# Patient Record
Sex: FEMALE | Race: WHITE | NOT HISPANIC OR LATINO | URBAN - METROPOLITAN AREA
[De-identification: names, ages, dates, MRNs, and addresses within clinical notes are randomized per-mention and may not be internally consistent; named-entity substitution may affect disease eponyms.]

---

## 2021-04-16 ENCOUNTER — BULK ORDERING (OUTPATIENT)
Dept: CASE MANAGEMENT | Facility: OTHER | Age: 31
End: 2021-04-16

## 2021-04-16 DIAGNOSIS — Z23 IMMUNIZATION DUE: ICD-10-CM

## 2023-02-10 ENCOUNTER — TELEPHONE (OUTPATIENT)
Dept: FAMILY MEDICINE CLINIC | Facility: CLINIC | Age: 33
End: 2023-02-10

## 2023-02-10 ENCOUNTER — OFFICE VISIT (OUTPATIENT)
Dept: FAMILY MEDICINE CLINIC | Facility: CLINIC | Age: 33
End: 2023-02-10
Payer: COMMERCIAL

## 2023-02-10 VITALS
DIASTOLIC BLOOD PRESSURE: 84 MMHG | OXYGEN SATURATION: 100 % | HEART RATE: 92 BPM | SYSTOLIC BLOOD PRESSURE: 118 MMHG | BODY MASS INDEX: 31.83 KG/M2 | WEIGHT: 173 LBS | HEIGHT: 62 IN

## 2023-02-10 DIAGNOSIS — Z00.00 ROUTINE GENERAL MEDICAL EXAMINATION AT A HEALTH CARE FACILITY: Primary | ICD-10-CM

## 2023-02-10 DIAGNOSIS — B08.4 HAND, FOOT AND MOUTH DISEASE: ICD-10-CM

## 2023-02-10 DIAGNOSIS — Z13.220 LIPID SCREENING: ICD-10-CM

## 2023-02-10 PROCEDURE — 99395 PREV VISIT EST AGE 18-39: CPT | Performed by: FAMILY MEDICINE

## 2023-02-10 PROCEDURE — 36415 COLL VENOUS BLD VENIPUNCTURE: CPT | Performed by: FAMILY MEDICINE

## 2023-02-10 RX ORDER — PRENATAL VIT NO.126/IRON/FOLIC 28MG-0.8MG
TABLET ORAL DAILY
COMMUNITY

## 2023-02-10 RX ORDER — FERROUS SULFATE 325(65) MG
325 TABLET ORAL
COMMUNITY

## 2023-02-10 RX ORDER — MELATONIN
1000 DAILY
COMMUNITY

## 2023-02-10 NOTE — PROGRESS NOTES
"Chief Complaint  Annual Exam    Subjective          Zayra Romero presents to [facility]  History of Present Illness  Patient comes in today saying she needs a physical and stuff as well she says she started a couple weeks ago with a fever she then broke out in a day or so later with a rash on the palm of her hands and soles of her feet she said it is spread a little bit it was painful for a day or 2 she says that her skin began to basically get the red dot she said she had a few other places to and then she said basically her skin has been peeling she does not know exactly what it was she says it is actually getting better and she is just a little bit concerned she would like to get her blood work done at this time as well she does have a 1-year-old son that she is presently breast-feeding and she says otherwise she has been doing relatively well      Objective   Vital Signs:   /84 (BP Location: Right arm, Patient Position: Sitting, Cuff Size: Adult)   Pulse 92   Ht 157.5 cm (62\")   Wt 78.5 kg (173 lb)   SpO2 100%   BMI 31.64 kg/m²     Body mass index is 31.64 kg/m².    Review of Systems   Constitutional: Negative.    HENT: Negative for congestion, dental problem, ear discharge, ear pain and sore throat.    Respiratory: Negative for apnea, chest tightness and shortness of breath.    Gastrointestinal: Negative for constipation and nausea.   Endocrine: Negative for polyuria.   Genitourinary: Negative for difficulty urinating.   Musculoskeletal: Negative for arthralgias and gait problem.   Skin: Positive for rash.   Hematological: Negative for adenopathy.       Past History:  Medical History: has a past medical history of Fracture.   Surgical History: has a past surgical history that includes Ankle surgery (Right, ); Dental surgery; and  section.         Current Outpatient Medications:   •  cholecalciferol (VITAMIN D3) 25 MCG (1000 UT) tablet, Take 1,000 " Units by mouth Daily., Disp: , Rfl:   •  ferrous sulfate 325 (65 FE) MG tablet, Take 325 mg by mouth Daily With Breakfast., Disp: , Rfl:   •  prenatal vitamin (prenatal, CLASSIC, vitamin) tablet, Take  by mouth Daily., Disp: , Rfl:     Allergies: Patient has no known allergies.    Physical Exam  Vitals reviewed.   Constitutional:       Appearance: Normal appearance.   HENT:      Head: Normocephalic and atraumatic.      Right Ear: Tympanic membrane, ear canal and external ear normal.      Left Ear: Tympanic membrane, ear canal and external ear normal.      Nose: Nose normal.      Mouth/Throat:      Mouth: Mucous membranes are moist.   Eyes:      Extraocular Movements: Extraocular movements intact.      Conjunctiva/sclera: Conjunctivae normal.      Pupils: Pupils are equal, round, and reactive to light.   Cardiovascular:      Rate and Rhythm: Normal rate and regular rhythm.   Pulmonary:      Effort: Pulmonary effort is normal.      Breath sounds: Normal breath sounds.   Abdominal:      General: Abdomen is flat. Bowel sounds are normal.      Palpations: Abdomen is soft.   Musculoskeletal:         General: Normal range of motion.   Feet:      Right foot:      Protective Sensation: 0 sites tested. 0 sites sensed.      Toenail Condition: Right toenails are normal.      Left foot:      Protective Sensation: 0 sites tested. 0 sites sensed.      Toenail Condition: Left toenails are normal.   Skin:     General: Skin is warm and dry.   Neurological:      General: No focal deficit present.      Mental Status: She is alert and oriented to person, place, and time. Mental status is at baseline.   Psychiatric:         Behavior: Behavior normal.         Thought Content: Thought content normal.         Judgment: Judgment normal.          Result Review :                   Assessment and Plan    Diagnoses and all orders for this visit:    1. Routine general medical examination at a health care facility (Primary)  Comments:  We will get  blood work discussed diet exercise and recent illness  Orders:  -     CBC & Differential; Future  -     Comprehensive Metabolic Panel; Future    2. Lipid screening  Comments:  We will get lipids  Orders:  -     Lipid Panel; Future    3. Hand, foot and mouth disease  Comments:  This patient recently had hand-foot-and-mouth reassurance and monitor            Updated annual wellness visit checklist.  Immunizations discussed.  Screening up-to-date.  Recommend yearly dental and eye exams. Also discussed monitoring of blood pressure and lipids.    Follow Up   No follow-ups on file.  Patient was given instructions and counseling regarding her condition or for health maintenance advice. Please see specific information pulled into the AVS if appropriate.     Rashel Kimbrough MD

## 2023-02-10 NOTE — TELEPHONE ENCOUNTER
Provider: VIKI     Caller: HEIDY KENYON     Phone Number: [phone]    Reason for Call:PATIENT HAS ALREADY HAD LABS DRAWN.  PATIENT IS ASKING IF [facility] WAS APART OF THOSE LABS.  PATIENT ASKED IF IT COULD BE ADDED IF NOT.

## 2023-02-11 LAB
ALBUMIN SERPL-MCNC: 4.8 G/DL (ref 3.8–4.8)
ALBUMIN/GLOB SERPL: 1.8 {RATIO} (ref 1.2–2.2)
ALP SERPL-CCNC: 141 IU/L (ref 44–121)
ALT SERPL-CCNC: 28 IU/L (ref 0–32)
AST SERPL-CCNC: 22 IU/L (ref 0–40)
BASOPHILS # BLD AUTO: 0 X10E3/UL (ref 0–0.2)
BASOPHILS NFR BLD AUTO: 1 %
BILIRUB SERPL-MCNC: 0.3 MG/DL (ref 0–1.2)
BUN SERPL-MCNC: 10 MG/DL (ref 6–20)
BUN/CREAT SERPL: 13 (ref 9–23)
CALCIUM SERPL-MCNC: 9.5 MG/DL (ref 8.7–10.2)
CHLORIDE SERPL-SCNC: 102 MMOL/L (ref 96–106)
CHOLEST SERPL-MCNC: 230 MG/DL (ref 100–199)
CO2 SERPL-SCNC: 27 MMOL/L (ref 20–29)
CREAT SERPL-MCNC: 0.77 MG/DL (ref 0.57–1)
EGFRCR SERPLBLD CKD-EPI 2021: 105 ML/MIN/1.73
EOSINOPHIL # BLD AUTO: 0.1 X10E3/UL (ref 0–0.4)
EOSINOPHIL NFR BLD AUTO: 1 %
ERYTHROCYTE [DISTWIDTH] IN BLOOD BY AUTOMATED COUNT: 12.5 % (ref 11.7–15.4)
GLOBULIN SER CALC-MCNC: 2.7 G/DL (ref 1.5–4.5)
GLUCOSE SERPL-MCNC: 85 MG/DL (ref 70–99)
HCT VFR BLD AUTO: 39.4 % (ref 34–46.6)
HDLC SERPL-MCNC: 57 MG/DL
HGB BLD-MCNC: 13 G/DL (ref 11.1–15.9)
IMM GRANULOCYTES # BLD AUTO: 0 X10E3/UL (ref 0–0.1)
IMM GRANULOCYTES NFR BLD AUTO: 0 %
LDLC SERPL CALC-MCNC: 144 MG/DL (ref 0–99)
LYMPHOCYTES # BLD AUTO: 2.4 X10E3/UL (ref 0.7–3.1)
LYMPHOCYTES NFR BLD AUTO: 30 %
MCH RBC QN AUTO: 28.3 PG (ref 26.6–33)
MCHC RBC AUTO-ENTMCNC: 33 G/DL (ref 31.5–35.7)
MCV RBC AUTO: 86 FL (ref 79–97)
MONOCYTES # BLD AUTO: 0.5 X10E3/UL (ref 0.1–0.9)
MONOCYTES NFR BLD AUTO: 7 %
NEUTROPHILS # BLD AUTO: 5 X10E3/UL (ref 1.4–7)
NEUTROPHILS NFR BLD AUTO: 61 %
PLATELET # BLD AUTO: 348 X10E3/UL (ref 150–450)
POTASSIUM SERPL-SCNC: 4.3 MMOL/L (ref 3.5–5.2)
PROT SERPL-MCNC: 7.5 G/DL (ref 6–8.5)
RBC # BLD AUTO: 4.6 X10E6/UL (ref 3.77–5.28)
SODIUM SERPL-SCNC: 144 MMOL/L (ref 134–144)
TRIGL SERPL-MCNC: 162 MG/DL (ref 0–149)
VLDLC SERPL CALC-MCNC: 29 MG/DL (ref 5–40)
WBC # BLD AUTO: 8.2 X10E3/UL (ref 3.4–10.8)

## 2023-02-14 ENCOUNTER — TELEPHONE (OUTPATIENT)
Dept: FAMILY MEDICINE CLINIC | Facility: CLINIC | Age: 33
End: 2023-02-14

## 2023-02-25 ENCOUNTER — OFFICE VISIT (OUTPATIENT)
Dept: FAMILY MEDICINE CLINIC | Facility: CLINIC | Age: 33
End: 2023-02-25
Payer: COMMERCIAL

## 2023-02-25 VITALS
DIASTOLIC BLOOD PRESSURE: 82 MMHG | WEIGHT: 164.4 LBS | BODY MASS INDEX: 30.25 KG/M2 | TEMPERATURE: 97.3 F | OXYGEN SATURATION: 98 % | SYSTOLIC BLOOD PRESSURE: 110 MMHG | HEIGHT: 62 IN | HEART RATE: 87 BPM

## 2023-02-25 DIAGNOSIS — K21.00 GASTROESOPHAGEAL REFLUX DISEASE WITH ESOPHAGITIS WITHOUT HEMORRHAGE: Primary | ICD-10-CM

## 2023-02-25 PROCEDURE — 99213 OFFICE O/P EST LOW 20 MIN: CPT | Performed by: FAMILY MEDICINE

## 2023-02-25 RX ORDER — FAMOTIDINE 40 MG/1
40 TABLET, FILM COATED ORAL 2 TIMES DAILY PRN
Qty: 60 TABLET | Refills: 5 | Status: SHIPPED | OUTPATIENT
Start: 2023-02-25

## 2023-02-25 NOTE — ASSESSMENT & PLAN NOTE
Discussed further work-up and treatment options.    Reviewed keeping medications to a minimum while breast-feeding.  Will treat with Pepcid twice daily and discussed recommendation to take this on a regular basis for the next 8 weeks to allow any irritation to heal and then she can back off and use as needed.    She may add in Maalox or Mylanta over-the-counter as needed.    Discussed if not controlled we can add in a PPI but will try and use Pepcid and avoid PPI given longer safety data with current breast-feeding with Pepcid use..

## 2023-02-25 NOTE — PROGRESS NOTES
"Chief Complaint  Heartburn (Patient has tried gas x and tums )    Subjective    History of Present Illness:  Zayra Romero is a 32 y.o. female who presents today for worsening problems with heartburn that has been waking her up at nighttime.  No dysphagia.  No history of ulcers.  No melena or hematochezia.    She did have problems with heartburn during pregnancy but things seem to improve after delivery.  Her son is 10-1/2 months old.  She is currently breast-feeding.    She is on a prenatal vitamin still with iron supplementation and vitamin D recommended by GYN.    She has tried Gas-X over-the-counter but no other medication  Objective   Vital Signs:   /82 (BP Location: Right arm, Patient Position: Sitting, Cuff Size: Adult)   Pulse 87   Temp 97.3 °F (36.3 °C) (Temporal)   Ht 157.5 cm (62\")   Wt 74.6 kg (164 lb 6.4 oz)   SpO2 98%   BMI 30.07 kg/m²     Review of Systems   Constitutional: Negative for appetite change, chills and fever.   HENT: Negative for hearing loss.    Eyes: Negative for blurred vision.   Respiratory: Negative for chest tightness.    Cardiovascular: Negative for chest pain.   Gastrointestinal: Positive for nausea, GERD and indigestion. Negative for abdominal distention, abdominal pain, blood in stool, constipation, diarrhea and vomiting.   Musculoskeletal: Negative for gait problem.   Skin: Negative for rash.   Psychiatric/Behavioral: Negative for depressed mood.       Past History:  Medical History: has a past medical history of Fracture.   Surgical History: has a past surgical history that includes Ankle surgery (Right, ); Dental surgery; and  section.   Family History: family history includes Hypertension in her mother.   Social History: reports that she quit smoking about 2 years ago. Her smoking use included cigarettes. She has a 14.00 pack-year smoking history. She has never used smokeless tobacco. She reports that she does not currently use alcohol. She " reports that she does not use drugs.      Current Outpatient Medications:   •  cholecalciferol (VITAMIN D3) 25 MCG (1000 UT) tablet, Take 1,000 Units by mouth Daily., Disp: , Rfl:   •  ferrous sulfate 325 (65 FE) MG tablet, Take 325 mg by mouth Daily With Breakfast., Disp: , Rfl:   •  prenatal vitamin (prenatal, CLASSIC, vitamin) tablet, Take  by mouth Daily., Disp: , Rfl:   •  famotidine (Pepcid) 40 MG tablet, Take 1 tablet by mouth 2 (Two) Times a Day As Needed for Indigestion or Heartburn., Disp: 60 tablet, Rfl: 5    Allergies: Patient has no known allergies.    Physical Exam  Constitutional:       Appearance: Normal appearance.   HENT:      Head: Normocephalic.      Right Ear: External ear normal.      Left Ear: External ear normal.      Nose: Nose normal.   Eyes:      Pupils: Pupils are equal, round, and reactive to light.   Cardiovascular:      Rate and Rhythm: Normal rate and regular rhythm.      Heart sounds: Normal heart sounds.   Pulmonary:      Effort: Pulmonary effort is normal.      Breath sounds: Normal breath sounds.   Abdominal:      General: Abdomen is flat. Bowel sounds are normal. There is no distension.      Palpations: Abdomen is soft.      Tenderness: There is no abdominal tenderness. There is no guarding or rebound.      Hernia: No hernia is present.   Musculoskeletal:         General: Normal range of motion.      Cervical back: Normal range of motion.   Skin:     General: Skin is warm and dry.   Neurological:      General: No focal deficit present.      Mental Status: She is alert.   Psychiatric:         Mood and Affect: Mood normal.         Behavior: Behavior normal.         Thought Content: Thought content normal.          Result Review                   Assessment and Plan  Diagnoses and all orders for this visit:    1. Gastroesophageal reflux disease with esophagitis without hemorrhage (Primary)  Assessment & Plan:  Discussed further work-up and treatment options.    Reviewed keeping  medications to a minimum while breast-feeding.  Will treat with Pepcid twice daily and discussed recommendation to take this on a regular basis for the next 8 weeks to allow any irritation to heal and then she can back off and use as needed.    She may add in Maalox or Mylanta over-the-counter as needed.    Discussed if not controlled we can add in a PPI but will try and use Pepcid and avoid PPI given longer safety data with current breast-feeding with Pepcid use..    Orders:  -     famotidine (Pepcid) 40 MG tablet; Take 1 tablet by mouth 2 (Two) Times a Day As Needed for Indigestion or Heartburn.  Dispense: 60 tablet; Refill: 5      BMI is >= 30 and <35. (Class 1 Obesity). The following options were offered after discussion;: exercise counseling/recommendations          Follow Up  Return if symptoms worsen or fail to improve.    Noé Adams MD

## 2023-04-29 ENCOUNTER — READMISSION MANAGEMENT (OUTPATIENT)
Dept: CALL CENTER | Facility: HOSPITAL | Age: 33
End: 2023-04-29
Payer: COMMERCIAL

## 2023-04-29 NOTE — OUTREACH NOTE
Prep Survey    Flowsheet Row Responses   [facility] facility patient discharged from? Non-[facility]   Is LACE score < 7 ? Non-BH Discharge   Eligibility [facility]   Date of Discharge 04/28/23   Discharge diagnosis abd pain   Does the patient have one of the following disease processes/diagnoses(primary or secondary)? Other   Prep survey completed? Yes          Anni Zaman Registered Nurse

## 2023-05-01 ENCOUNTER — TRANSITIONAL CARE MANAGEMENT TELEPHONE ENCOUNTER (OUTPATIENT)
Dept: CALL CENTER | Facility: HOSPITAL | Age: 33
End: 2023-05-01
Payer: COMMERCIAL

## 2023-05-01 ENCOUNTER — TELEPHONE (OUTPATIENT)
Dept: FAMILY MEDICINE CLINIC | Facility: CLINIC | Age: 33
End: 2023-05-01

## 2023-05-01 RX ORDER — PANTOPRAZOLE SODIUM 40 MG/1
40 TABLET, DELAYED RELEASE ORAL DAILY
Qty: 30 TABLET | Refills: 3 | Status: SHIPPED | OUTPATIENT
Start: 2023-05-01

## 2023-05-01 NOTE — TELEPHONE ENCOUNTER
Please let patient know that I sent in a prescription for Protonix given worsening GERD and reflux symptoms.    I would like her to take the Protonix first thing in the morning 30 minutes before she eats or takes other meds.  She can still use the Pepcid that she was given twice a day but the Protonix should help significantly with the acid irritation.  She may add in Maalox but it is not recommended to consume a lot of lidocaine due to the potential for heart side effects and rhythm difficulties.    I would also recommend if she has ongoing symptoms considering further work-up for possible gallbladder issues.

## 2023-05-01 NOTE — TELEPHONE ENCOUNTER
Caller: Zayra Romero    Relationship: Self    Best call back number: [phone]    What medication are you requesting: VISCOUS LIDOCAINE MAALOX COMPOUND     What are your current symptoms: GALL BLADDER PAIN     How long have you been experiencing symptoms: A WHILE    Have you had these symptoms before:    [x] Yes  [] No    Have you been treated for these symptoms before:   [x] Yes  [] No    If a prescription is needed, what is your preferred pharmacy and phone number: [address] [phone] [facility] [phone] [facility]     Additional notes: PATIENT WENT TO ER AND WAS PRESCRIBED THIS MEDICATION BUT THE PHARMACY NEVER RECEIVED IT.  PATIENT STATED THAT SHE IS IN A LOT OF PAIN AND THEY HAVE NOT RECEIVED THE MEDICATION.  PATIENT STATED THAT SHE CANNOT WORK DUE TO THE PAIN         D

## 2023-05-01 NOTE — OUTREACH NOTE
Call Center TCM Note    Flowsheet Row Responses   [facility] patient discharged from? Non-  [[facility]]   Does the patient have one of the following disease processes/diagnoses(primary or secondary)? Other   TCM attempt successful? Yes   Call start time 1033   Call end time 1043   Discharge diagnosis abd pain  (patient reports chest pain)   Person spoke with today (if not patient) and relationship Patient   Meds reviewed with patient/caregiver? Yes   Does the patient have all medications ordered at discharge? No   What is keeping the patient from filling the prescriptions? --  [[facility] ordered by the hospital, but now that have received and she is waiting on it to be filled. ]   Nursing Interventions No intervention needed   Is the patient taking all medications as directed (includes completed medication regime)? No   What is preventing the patient from taking all medications as directed? Other  [See above. Patient reports that she will go  med as soon as pharmacy lets her know it is ready. ]   Medication comments Patient reports treated with GI cocktail in hospital and it helped her pain.    Comments PCP Dr Kimbrough. Patient declines to schedule PCP appt with call today.    Does the patient have an appointment with their PCP within 7 days of discharge? No   Nursing Interventions Patient declined scheduling/rescheduling appointment at this time, Patient desires to follow up with specialty only  [Patient reports that she is scheduled to see surgeon on Thurs next week 5/11 and declines to schedule appt with PCP right now. ]   Has home health visited the patient within 72 hours of discharge? N/A   Psychosocial issues? No   Did the patient receive a copy of their discharge instructions? Yes   What is the patient's perception of their health status since discharge? Same  [Patient reports that her pain was improved  after first getting home from hospital but that it came back again this morning, has eased off again. ]   Is the patient/caregiver able to teach back signs and symptoms related to disease process for when to call PCP? Yes   Is the patient/caregiver able to teach back signs and symptoms related to disease process for when to call 911? Yes   Is the patient/caregiver able to teach back the hierarchy of who to call/visit for symptoms/problems? PCP, Specialist, Home health nurse, Urgent Care, ED, 911 Yes   If the patient is a current smoker, are they able to teach back resources for cessation? Not a smoker   TCM call completed? Yes   Wrap up additional comments Patient reports that she will be following up with general surgeon next week to discuss gallbladder removal.    Call end time 1043   Would this patient benefit from a Referral to Amb Social Work? No   Is the patient interested in additional calls from an ambulatory ?  NOTE:  applies to high risk patients requiring additional follow-up. No          Linda Plummer RN    5/1/2023, 10:45 EDT

## 2024-10-24 ENCOUNTER — OFFICE VISIT (OUTPATIENT)
Dept: FAMILY MEDICINE CLINIC | Facility: CLINIC | Age: 34
End: 2024-10-24
Payer: COMMERCIAL

## 2024-10-24 VITALS
WEIGHT: 169.2 LBS | DIASTOLIC BLOOD PRESSURE: 88 MMHG | HEIGHT: 63 IN | SYSTOLIC BLOOD PRESSURE: 122 MMHG | HEART RATE: 76 BPM | OXYGEN SATURATION: 98 % | BODY MASS INDEX: 29.98 KG/M2 | TEMPERATURE: 98.4 F

## 2024-10-24 DIAGNOSIS — Z13.29 SCREENING FOR THYROID DISORDER: ICD-10-CM

## 2024-10-24 DIAGNOSIS — Z76.89 ENCOUNTER TO ESTABLISH CARE: ICD-10-CM

## 2024-10-24 DIAGNOSIS — Z13.220 LIPID SCREENING: ICD-10-CM

## 2024-10-24 DIAGNOSIS — Z13.1 SCREENING FOR DIABETES MELLITUS: ICD-10-CM

## 2024-10-24 DIAGNOSIS — F41.9 ANXIETY: ICD-10-CM

## 2024-10-24 DIAGNOSIS — H69.92 DYSFUNCTION OF LEFT EUSTACHIAN TUBE: ICD-10-CM

## 2024-10-24 PROCEDURE — 99214 OFFICE O/P EST MOD 30 MIN: CPT | Performed by: PHYSICIAN ASSISTANT

## 2024-10-24 RX ORDER — ESCITALOPRAM OXALATE 10 MG/1
10 TABLET ORAL DAILY
Qty: 30 TABLET | Refills: 1 | Status: SHIPPED | OUTPATIENT
Start: 2024-10-24

## 2024-10-24 NOTE — PROGRESS NOTES
Office Note     Name: Zayra Romero    : 1990     MRN: [id number]     Chief Complaint  Establish Care, Anxiety, and Fatigue (Light headedness  )    Subjective   Patient or patient representative verbalized consent for the use of Ambient Listening during the visit with  Zayra Call PA-C for chart documentation. 2024  15:01 EDT      Zayra Romero is a 34 y.o. female.     The patient presents for evaluation of multiple medical concerns.    She experienced a severe left side migraine on Monday, characterized by a dull ache with sharp moments that blurred her vision and caused dizziness. This episode triggered a panic attack, which exacerbated her symptoms. She also reported difficulty breathing during her drive to the clinic, which she managed to control.  She has been experiencing pain in her left ear, which she describes as feeling like there is water in it. The pain radiates behind the ear. She has used nasal spray in the past, but does not currently use it.    She has been experiencing increased anxiety since the birth of her child. She often feels nervous, anxious, or on edge, but is able to control her worrying. She reports excessive worrying about various things and restlessness, but does not feel easily annoyed or irritable. She often feels afraid as if something terrible might happen, but is able to distract herself from these thoughts. She reports no sleep disturbances, feelings of fatigue or low energy, poor appetite, feelings of worthlessness, concentration issues, or thoughts of self-harm. She has not previously taken any medication for anxiety and has not sought counseling for her anxiety.    She has noticed a lot of low blood sugar levels. She does not eat a lot and has to snack frequently during the day; otherwise, she feels fatigued. She had her last blood work done in  when she was pregnant.       Review of Systems:   Review of Systems   Constitutional:   Positive for fatigue. Negative for activity change, appetite change, fever, unexpected weight gain and unexpected weight loss.   HENT:  Positive for ear pain and sinus pressure. Negative for congestion, ear discharge, postnasal drip, rhinorrhea, sneezing, sore throat, trouble swallowing and voice change.    Eyes:  Positive for visual disturbance. Negative for blurred vision, double vision, photophobia, pain and itching.   Respiratory:  Negative for apnea, cough, chest tightness, shortness of breath and wheezing.    Cardiovascular:  Negative for chest pain, palpitations and leg swelling.   Gastrointestinal:  Negative for abdominal pain, blood in stool, constipation, diarrhea, nausea, vomiting and GERD.   Endocrine: Negative for cold intolerance, heat intolerance, polydipsia and polyuria.   Genitourinary:  Negative for decreased urine volume, difficulty urinating, dysuria, flank pain, frequency, hematuria and urinary incontinence.   Musculoskeletal:  Negative for arthralgias, back pain, gait problem, joint swelling and myalgias.   Skin:  Negative for rash, skin lesions and wound.   Allergic/Immunologic: Negative for environmental allergies and food allergies.   Neurological:  Positive for headache. Negative for dizziness, syncope, weakness, light-headedness and numbness.   Hematological:  Negative for adenopathy. Does not bruise/bleed easily.   Psychiatric/Behavioral:  Negative for decreased concentration, dysphoric mood, sleep disturbance, depressed mood and stress. The patient is nervous/anxious.        Past Medical History:   Past Medical History:   Diagnosis Date    Cholelithiasis 3/2023    Gall bladder removed    Fracture        Past Surgical History:   Past Surgical History:   Procedure Laterality Date    ANKLE SURGERY Right 2012     SECTION      CHOLECYSTECTOMY  2023    I believe it was 2023    DENTAL PROCEDURE      FRACTURE SURGERY  2011    Right lower leg       Family History:   Family History  "  Problem Relation Age of Onset    Hypertension Mother     COPD Maternal Grandmother     Diabetes type II Maternal Grandmother     Heart attack Maternal Grandfather 50        has pacemaker       Social History:   Social History     Socioeconomic History    Marital status: Single   Tobacco Use    Smoking status: Former     Current packs/day: 0.00     Average packs/day: 1 pack/day for 14.0 years (14.0 ttl pk-yrs)     Types: Cigarettes     Start date: 1/1/2007     Quit date: 9/1/2021     Years since quitting: 3.1    Smokeless tobacco: Never   Vaping Use    Vaping status: Never Used   Substance and Sexual Activity    Alcohol use: Not Currently    Drug use: Never    Sexual activity: Yes     Partners: Male     Birth control/protection: Vasectomy       Immunizations:   Immunization History   Administered Date(s) Administered    Hpv9 06/06/2016    Tdap 01/03/2022        Medications:     Current Outpatient Medications:     cholecalciferol (VITAMIN D3) 25 MCG (1000 UT) tablet, Take 1,000 Units by mouth Daily. (Patient not taking: Reported on 10/24/2024), Disp: , Rfl:     escitalopram (Lexapro) 10 MG tablet, Take 1 tablet by mouth Daily., Disp: 30 tablet, Rfl: 1    famotidine (Pepcid) 40 MG tablet, Take 1 tablet by mouth 2 (Two) Times a Day As Needed for Indigestion or Heartburn. (Patient not taking: Reported on 10/24/2024), Disp: 60 tablet, Rfl: 5    ferrous sulfate 325 (65 FE) MG tablet, Take 325 mg by mouth Daily With Breakfast. (Patient not taking: Reported on 10/24/2024), Disp: , Rfl:     pantoprazole (Protonix) 40 MG EC tablet, Take 1 tablet by mouth Daily. (Patient not taking: Reported on 10/24/2024), Disp: 30 tablet, Rfl: 3    prenatal vitamin (prenatal, CLASSIC, vitamin) tablet, Take  by mouth Daily. (Patient not taking: Reported on 10/24/2024), Disp: , Rfl:     Allergies:   No Known Allergies    Objective     Vital Signs  /88   Pulse 76   Temp 98.4 °F (36.9 °C) (Infrared)   Ht 160 cm (63\")   Wt 76.7 kg (169 " "lb 3.2 oz)   SpO2 98%   BMI 29.97 kg/m²   Estimated body mass index is 29.97 kg/m² as calculated from the following:    Height as of this encounter: 160 cm (63\").    Weight as of this encounter: 76.7 kg (169 lb 3.2 oz).        Physical Exam  Vitals and nursing note reviewed.   Constitutional:       General: She is not in acute distress.     Appearance: Normal appearance. She is not ill-appearing.   HENT:      Head: Normocephalic and atraumatic.      Right Ear: Tympanic membrane, ear canal and external ear normal.      Left Ear: Ear canal and external ear normal.      Ears:      Comments: Fluid behind left TM     Nose: Nose normal.      Mouth/Throat:      Mouth: Mucous membranes are moist.      Pharynx: Oropharynx is clear. No oropharyngeal exudate or posterior oropharyngeal erythema.   Eyes:      Extraocular Movements: Extraocular movements intact.      Conjunctiva/sclera: Conjunctivae normal.      Pupils: Pupils are equal, round, and reactive to light.   Cardiovascular:      Rate and Rhythm: Normal rate and regular rhythm.      Heart sounds: Normal heart sounds.   Pulmonary:      Effort: Pulmonary effort is normal.      Breath sounds: Normal breath sounds.   Abdominal:      General: Bowel sounds are normal.      Palpations: Abdomen is soft. There is no mass.      Tenderness: There is no abdominal tenderness. There is no right CVA tenderness, left CVA tenderness or guarding.      Hernia: No hernia is present.   Musculoskeletal:      Cervical back: Normal range of motion and neck supple.      Right lower leg: No edema.      Left lower leg: No edema.   Lymphadenopathy:      Cervical: No cervical adenopathy.   Skin:     General: Skin is warm.   Neurological:      General: No focal deficit present.      Mental Status: She is alert and oriented to person, place, and time.      Motor: No weakness.      Coordination: Coordination normal.      Gait: Gait normal.   Psychiatric:         Mood and Affect: Mood normal.         " Behavior: Behavior normal.       PHQ-9 Depression Screening  Little interest or pleasure in doing things? Not at all   Feeling down, depressed, or hopeless? Not at all   PHQ-2 Total Score 0   Trouble falling or staying asleep, or sleeping too much? Not at all   Feeling tired or having little energy? Not at all   Poor appetite or overeating? Several days   Feeling bad about yourself - or that you are a failure or have let yourself or your family down? Not at all   Trouble concentrating on things, such as reading the newspaper or watching television? Not at all   Moving or speaking so slowly that other people could have noticed? Or the opposite - being so fidgety or restless that you have been moving around a lot more than usual? Not at all   Thoughts that you would be better off dead, or of hurting yourself in some way? Not at all   PHQ-9 Total Score 1       BJORN-7      Over the last two weeks, how often have you been bothered by the following problems?  Feeling nervous, anxious or on edge: Nearly every day  Not being able to stop or control worrying: Several days  Worrying too much about different things: More than half the days  Trouble Relaxing: Not at all  Being so restless that it is hard to sit still: More than half the days  Becoming easily annoyed or irritable: Several days  Feeling afraid as if something awful might happen: Nearly every day  BJORN 7 Total Score: 12    Results:  No results found for this or any previous visit (from the past 24 hours).       Assessment and Plan       Diagnoses and all orders for this visit:    1. Encounter to establish care  Comments:  Available records reviewed.    2. Anxiety  Assessment & Plan:  Her depression score is 1 and anxiety score is 12, indicating significant anxiety. Lexapro 10 mg was prescribed, to be taken with food. She was advised to halve the dose if it proves too potent. Potential side effects, including nausea, headache, weight gain, and decreased libido, were  discussed. She was also provided with contact information for local counseling services. If she experiences any adverse effects, she should stop the medication immediately and inform the clinic. Return in 4 to 6 weeks for follow-up.    Orders:  -     Comprehensive Metabolic Panel; Future  -     CBC & Differential  -     escitalopram (Lexapro) 10 MG tablet; Take 1 tablet by mouth Daily.  Dispense: 30 tablet; Refill: 1  -     Comprehensive Metabolic Panel    3. Dysfunction of left eustachian tube  Assessment & Plan:  The pain could be due to fluid accumulation behind the ear, possibly related to allergies. Over-the-counter Flonase was recommended, with instructions on its proper use.       4. Screening for diabetes mellitus  -     Hemoglobin A1c; Future  -     Hemoglobin A1c    5. Screening for thyroid disorder  -     Thyroid Cascade Profile; Future  -     Thyroid Cascade Profile    6. Lipid screening  -     Lipid Panel; Future  -     Lipid Panel        Follow Up  Return for recheck in 4-6 weeks.    Zayra Call PA-C  [facility] [address] [facility]

## 2024-10-24 NOTE — PATIENT INSTRUCTIONS
Local Options for Counseling:  [facility]- [phone]  [facility] [place] [facility]- [phone]  [facility]- [phone]  [facility]- [phone]

## 2024-10-25 LAB
ALBUMIN SERPL-MCNC: 4.7 G/DL (ref 3.9–4.9)
ALP SERPL-CCNC: 104 IU/L (ref 44–121)
ALT SERPL-CCNC: 26 IU/L (ref 0–32)
AST SERPL-CCNC: 19 IU/L (ref 0–40)
BASOPHILS # BLD AUTO: 0.1 X10E3/UL (ref 0–0.2)
BASOPHILS NFR BLD AUTO: 1 %
BILIRUB SERPL-MCNC: 0.2 MG/DL (ref 0–1.2)
BUN SERPL-MCNC: 9 MG/DL (ref 6–20)
BUN/CREAT SERPL: 11 (ref 9–23)
CALCIUM SERPL-MCNC: 9.7 MG/DL (ref 8.7–10.2)
CHLORIDE SERPL-SCNC: 101 MMOL/L (ref 96–106)
CHOLEST SERPL-MCNC: 230 MG/DL (ref 100–199)
CO2 SERPL-SCNC: 27 MMOL/L (ref 20–29)
CREAT SERPL-MCNC: 0.83 MG/DL (ref 0.57–1)
EGFRCR SERPLBLD CKD-EPI 2021: 95 ML/MIN/1.73
EOSINOPHIL # BLD AUTO: 0.1 X10E3/UL (ref 0–0.4)
EOSINOPHIL NFR BLD AUTO: 1 %
ERYTHROCYTE [DISTWIDTH] IN BLOOD BY AUTOMATED COUNT: 12.6 % (ref 11.7–15.4)
GLOBULIN SER CALC-MCNC: 2.7 G/DL (ref 1.5–4.5)
GLUCOSE SERPL-MCNC: 93 MG/DL (ref 70–99)
HBA1C MFR BLD: 5.8 % (ref 4.8–5.6)
HCT VFR BLD AUTO: 40.9 % (ref 34–46.6)
HDLC SERPL-MCNC: 42 MG/DL
HGB BLD-MCNC: 13.6 G/DL (ref 11.1–15.9)
IMM GRANULOCYTES # BLD AUTO: 0 X10E3/UL (ref 0–0.1)
IMM GRANULOCYTES NFR BLD AUTO: 0 %
LDLC SERPL CALC-MCNC: 131 MG/DL (ref 0–99)
LYMPHOCYTES # BLD AUTO: 2.7 X10E3/UL (ref 0.7–3.1)
LYMPHOCYTES NFR BLD AUTO: 34 %
MCH RBC QN AUTO: 27.4 PG (ref 26.6–33)
MCHC RBC AUTO-ENTMCNC: 33.3 G/DL (ref 31.5–35.7)
MCV RBC AUTO: 82 FL (ref 79–97)
MONOCYTES # BLD AUTO: 0.4 X10E3/UL (ref 0.1–0.9)
MONOCYTES NFR BLD AUTO: 6 %
NEUTROPHILS # BLD AUTO: 4.6 X10E3/UL (ref 1.4–7)
NEUTROPHILS NFR BLD AUTO: 58 %
PLATELET # BLD AUTO: 366 X10E3/UL (ref 150–450)
POTASSIUM SERPL-SCNC: 4.1 MMOL/L (ref 3.5–5.2)
PROT SERPL-MCNC: 7.4 G/DL (ref 6–8.5)
RBC # BLD AUTO: 4.97 X10E6/UL (ref 3.77–5.28)
SODIUM SERPL-SCNC: 141 MMOL/L (ref 134–144)
TRIGL SERPL-MCNC: 318 MG/DL (ref 0–149)
TSH SERPL DL<=0.005 MIU/L-ACNC: 1.6 UIU/ML (ref 0.45–4.5)
VLDLC SERPL CALC-MCNC: 57 MG/DL (ref 5–40)
WBC # BLD AUTO: 7.8 X10E3/UL (ref 3.4–10.8)

## 2024-10-30 ENCOUNTER — TELEPHONE (OUTPATIENT)
Dept: FAMILY MEDICINE CLINIC | Facility: CLINIC | Age: 34
End: 2024-10-30
Payer: COMMERCIAL

## 2024-10-30 NOTE — TELEPHONE ENCOUNTER
Caller: Zayra Romero    Relationship: Self    Best call back number: [phone]    Caller requesting test results: YES     What test was performed: BLOOD WORK     When was the test performed: 10/24/2024    Where was the test performed: IN OFFICE    Additional notes: PATIENT IS WANTING TO SPEAK WITH STAFF ABOUT HER BLOOD WORK RESULTS.

## 2024-11-07 PROBLEM — F41.9 ANXIETY: Status: ACTIVE | Noted: 2024-11-07

## 2024-11-07 PROBLEM — H69.92 DYSFUNCTION OF LEFT EUSTACHIAN TUBE: Status: ACTIVE | Noted: 2024-11-07

## 2024-11-07 NOTE — ASSESSMENT & PLAN NOTE
The pain could be due to fluid accumulation behind the ear, possibly related to allergies. Over-the-counter Flonase was recommended, with instructions on its proper use.

## 2024-11-07 NOTE — ASSESSMENT & PLAN NOTE
Her depression score is 1 and anxiety score is 12, indicating significant anxiety. Lexapro 10 mg was prescribed, to be taken with food. She was advised to halve the dose if it proves too potent. Potential side effects, including nausea, headache, weight gain, and decreased libido, were discussed. She was also provided with contact information for local counseling services. If she experiences any adverse effects, she should stop the medication immediately and inform the clinic. Return in 4 to 6 weeks for follow-up.

## 2024-12-13 DIAGNOSIS — F41.9 ANXIETY: ICD-10-CM

## 2024-12-13 RX ORDER — ESCITALOPRAM OXALATE 10 MG/1
10 TABLET ORAL DAILY
Qty: 30 TABLET | Refills: 1 | Status: SHIPPED | OUTPATIENT
Start: 2024-12-13

## 2025-02-04 ENCOUNTER — OFFICE VISIT (OUTPATIENT)
Dept: FAMILY MEDICINE CLINIC | Facility: CLINIC | Age: 35
End: 2025-02-04
Payer: COMMERCIAL

## 2025-02-04 VITALS
WEIGHT: 175.6 LBS | DIASTOLIC BLOOD PRESSURE: 70 MMHG | SYSTOLIC BLOOD PRESSURE: 116 MMHG | HEIGHT: 63 IN | BODY MASS INDEX: 31.11 KG/M2 | RESPIRATION RATE: 15 BRPM

## 2025-02-04 DIAGNOSIS — G44.209 TENSION HEADACHE: ICD-10-CM

## 2025-02-04 DIAGNOSIS — F41.9 ANXIETY: Primary | ICD-10-CM

## 2025-02-04 PROCEDURE — 99214 OFFICE O/P EST MOD 30 MIN: CPT | Performed by: PHYSICIAN ASSISTANT

## 2025-02-04 RX ORDER — ESCITALOPRAM OXALATE 10 MG/1
10 TABLET ORAL DAILY
Qty: 90 TABLET | Refills: 3 | Status: SHIPPED | OUTPATIENT
Start: 2025-02-04

## 2025-02-04 NOTE — PROGRESS NOTES
Office Note     Name: Zayra Romero    : 1990     MRN: [id number]     Chief Complaint  Follow-up and Anxiety (Patient is here for a follow up on anxiety )    Subjective   Patient or patient representative verbalized consent for the use of Ambient Listening during the visit with  Zayar Call PA-C for chart documentation. 2025  13:33 EST      Zayra Romero is a 34 y.o. female.     The patient presents for evaluation of anxiety.    She reports improvement with the current medication regimen (Lexapro 10 mg) for the past 3 months and wishes to continue. She experiences mild anxiety symptoms, including heart palpitations, about 30 minutes before her 9:30 AM medication intake, more pronounced on workdays. On weekends, she may delay intake by 30 minutes due to family commitments. She reports occasional severe anxiety episodes, such as a recent migraine and neck discomfort, leading to a fear of fainting, but notes a significant reduction in frequency. She describes increased energy and happiness since starting the medication, with no adverse effects. She has health-related anxiety.    She experiences neck tension and headaches, attributed to constant movement and stress. She avoids cricking her neck and has started Pilates and stretching. A bump behind her ear, assumed to be a lymph node, has resolved. She has jaw and TMJ issues, related to her other symptoms. No change in neck tension with anxiety medication.    MEDICATIONS  - Lexapro    Review of Systems:   Review of Systems   All other systems reviewed and are negative.      Past Medical History:   Past Medical History:   Diagnosis Date    Cholelithiasis 3/2023    Gall bladder removed    Fracture        Past Surgical History:   Past Surgical History:   Procedure Laterality Date    ANKLE SURGERY Right 2012     SECTION      CHOLECYSTECTOMY  2023    I believe it was 2023    DENTAL PROCEDURE      FRACTURE SURGERY       "Right lower leg       Family History:   Family History   Problem Relation Age of Onset    Hypertension Mother     COPD Maternal Grandmother     Diabetes type II Maternal Grandmother     Heart attack Maternal Grandfather 50        has pacemaker       Social History:   Social History     Socioeconomic History    Marital status: Single   Tobacco Use    Smoking status: Former     Current packs/day: 0.00     Average packs/day: 1 pack/day for 14.0 years (14.0 ttl pk-yrs)     Types: Cigarettes     Start date: 1/1/2007     Quit date: 9/1/2021     Years since quitting: 3.4    Smokeless tobacco: Never   Vaping Use    Vaping status: Never Used   Substance and Sexual Activity    Alcohol use: Not Currently    Drug use: Never    Sexual activity: Yes     Partners: Male     Birth control/protection: Vasectomy       Immunizations:   Immunization History   Administered Date(s) Administered    Hpv9 06/06/2016    Tdap 01/03/2022        Medications:     Current Outpatient Medications:     escitalopram (Lexapro) 10 MG tablet, Take 1 tablet by mouth Daily., Disp: 90 tablet, Rfl: 3    Allergies:   No Known Allergies    Objective     Vital Signs  /70 (BP Location: Right arm, Patient Position: Sitting, Cuff Size: Adult)   Resp 15   Ht 160 cm (63\")   Wt 79.7 kg (175 lb 9.6 oz)   BMI 31.11 kg/m²   Estimated body mass index is 31.11 kg/m² as calculated from the following:    Height as of this encounter: 160 cm (63\").    Weight as of this encounter: 79.7 kg (175 lb 9.6 oz).            Physical Exam  Vitals and nursing note reviewed.   Constitutional:       General: She is not in acute distress.     Appearance: Normal appearance. She is not ill-appearing.   HENT:      Head: Normocephalic and atraumatic.   Cardiovascular:      Rate and Rhythm: Normal rate and regular rhythm.      Pulses: Normal pulses.      Heart sounds: Normal heart sounds.   Pulmonary:      Effort: Pulmonary effort is normal. No respiratory distress.      Breath " sounds: Normal breath sounds.   Musculoskeletal:      Right lower leg: No edema.      Left lower leg: No edema.   Skin:     General: Skin is warm and dry.   Neurological:      General: No focal deficit present.      Mental Status: She is alert and oriented to person, place, and time.      Coordination: Coordination normal.      Gait: Gait normal.   Psychiatric:         Mood and Affect: Mood normal.         Behavior: Behavior normal.          Results:  No results found for this or any previous visit (from the past 24 hours).     BJORN-7      Over the last two weeks, how often have you been bothered by the following problems?  Feeling nervous, anxious or on edge: More than half the days  Not being able to stop or control worrying: Not at all  Worrying too much about different things: Several days  Trouble Relaxing: Not at all  Being so restless that it is hard to sit still: Not at all  Becoming easily annoyed or irritable: Not at all  Feeling afraid as if something awful might happen: More than half the days  BJORN 7 Total Score: 5  If you checked any problems, how difficult have these problems made it for you to do your work, take care of things at home, or get along with other people: Not difficult at all    Assessment and Plan       Diagnoses and all orders for this visit:    1. Anxiety (Primary)  Assessment & Plan:  Anxiety well-managed with Lexapro 10 mg. Significant reduction in worry and heart racing. No negative side effects. Continue Lexapro 10 mg. Prescription for 90-day supply sent to [facility] pharmacy. Return sooner if additional medication or dosage increase needed.    Orders:  -     escitalopram (Lexapro) 10 MG tablet; Take 1 tablet by mouth Daily.  Dispense: 90 tablet; Refill: 3    2. Tension headache  Assessment & Plan:  Experiences tension headaches with excessive neck movement. Managing with stretching and Pilates. Advised to continue exercises and be mindful of neck movements. Further evaluation if  symptoms persist.               Follow Up  Return for annual physical when due in October or sooner PRN.    Zayra Call PA-C  [facility] Internal [facility]

## 2025-02-04 NOTE — ASSESSMENT & PLAN NOTE
Anxiety well-managed with Lexapro 10 mg. Significant reduction in worry and heart racing. No negative side effects. Continue Lexapro 10 mg. Prescription for 90-day supply sent to [facility] pharmacy. Return sooner if additional medication or dosage increase needed.

## 2025-02-04 NOTE — ASSESSMENT & PLAN NOTE
Experiences tension headaches with excessive neck movement. Managing with stretching and Pilates. Advised to continue exercises and be mindful of neck movements. Further evaluation if symptoms persist.

## 2025-02-20 DIAGNOSIS — F41.9 ANXIETY: ICD-10-CM

## 2025-02-21 RX ORDER — ESCITALOPRAM OXALATE 10 MG/1
10 TABLET ORAL DAILY
Qty: 30 TABLET | Refills: 0 | OUTPATIENT
Start: 2025-02-21

## 2025-02-24 ENCOUNTER — TELEPHONE (OUTPATIENT)
Dept: FAMILY MEDICINE CLINIC | Facility: CLINIC | Age: 35
End: 2025-02-24
Payer: COMMERCIAL

## 2025-02-24 DIAGNOSIS — F41.9 ANXIETY: ICD-10-CM

## 2025-02-24 RX ORDER — ESCITALOPRAM OXALATE 10 MG/1
10 TABLET ORAL DAILY
Qty: 90 TABLET | Refills: 3 | Status: SHIPPED | OUTPATIENT
Start: 2025-02-24

## 2025-02-24 NOTE — TELEPHONE ENCOUNTER
Caller: Zayra Romero    Relationship: Self    Best call back number: [phone]    Which medication are you concerned about: ESCITALOPRAM    Who prescribed you this medication: ZAYRA ANDERSON    What are your concerns: MEDICATION WAS SENT TO WRONG PHARMACY. PLEASE CANCEL AND RESEND TO [facility].

## 2025-02-24 NOTE — TELEPHONE ENCOUNTER
*HUB CAN RELAY*    [facility] changed Lexapro to correct pharmacy [facility] in [place] and cancelled the previous once sent to [facility] [place].

## 2025-08-08 ENCOUNTER — OFFICE VISIT (OUTPATIENT)
Dept: FAMILY MEDICINE CLINIC | Facility: CLINIC | Age: 35
End: 2025-08-08
Payer: COMMERCIAL

## 2025-08-08 VITALS
DIASTOLIC BLOOD PRESSURE: 70 MMHG | HEART RATE: 79 BPM | WEIGHT: 174 LBS | HEIGHT: 63 IN | OXYGEN SATURATION: 98 % | SYSTOLIC BLOOD PRESSURE: 110 MMHG | BODY MASS INDEX: 30.83 KG/M2

## 2025-08-08 DIAGNOSIS — F41.9 ANXIETY: ICD-10-CM

## 2025-08-08 DIAGNOSIS — G44.209 TENSION HEADACHE: ICD-10-CM

## 2025-08-08 DIAGNOSIS — Z86.39 HISTORY OF ELEVATED LIPIDS: ICD-10-CM

## 2025-08-08 DIAGNOSIS — Z11.59 NEED FOR HEPATITIS C SCREENING TEST: ICD-10-CM

## 2025-08-08 DIAGNOSIS — Z00.00 GENERAL MEDICAL EXAM: Primary | ICD-10-CM

## 2025-08-08 RX ORDER — ESCITALOPRAM OXALATE 10 MG/1
10 TABLET ORAL DAILY
Qty: 90 TABLET | Refills: 3 | Status: SHIPPED | OUTPATIENT
Start: 2025-08-08

## 2025-08-09 LAB
ALBUMIN SERPL-MCNC: 4.4 G/DL (ref 3.9–4.9)
ALP SERPL-CCNC: 97 IU/L (ref 44–121)
ALT SERPL-CCNC: 18 IU/L (ref 0–32)
AST SERPL-CCNC: 17 IU/L (ref 0–40)
BASOPHILS # BLD AUTO: 0 X10E3/UL (ref 0–0.2)
BASOPHILS NFR BLD AUTO: 0 %
BILIRUB SERPL-MCNC: 0.2 MG/DL (ref 0–1.2)
BUN SERPL-MCNC: 8 MG/DL (ref 6–20)
BUN/CREAT SERPL: 11 (ref 9–23)
CALCIUM SERPL-MCNC: 9.2 MG/DL (ref 8.7–10.2)
CHLORIDE SERPL-SCNC: 102 MMOL/L (ref 96–106)
CHOLEST SERPL-MCNC: 199 MG/DL (ref 100–199)
CO2 SERPL-SCNC: 24 MMOL/L (ref 20–29)
CREAT SERPL-MCNC: 0.72 MG/DL (ref 0.57–1)
EGFRCR SERPLBLD CKD-EPI 2021: 112 ML/MIN/1.73
EOSINOPHIL # BLD AUTO: 0.1 X10E3/UL (ref 0–0.4)
EOSINOPHIL NFR BLD AUTO: 1 %
ERYTHROCYTE [DISTWIDTH] IN BLOOD BY AUTOMATED COUNT: 13.1 % (ref 11.7–15.4)
GLOBULIN SER CALC-MCNC: 2.6 G/DL (ref 1.5–4.5)
GLUCOSE SERPL-MCNC: 80 MG/DL (ref 70–99)
HBA1C MFR BLD: 5.5 % (ref 4.8–5.6)
HCT VFR BLD AUTO: 36.5 % (ref 34–46.6)
HCV AB SERPL QL IA: NON REACTIVE
HCV AB SERPL QL IA: NORMAL
HDLC SERPL-MCNC: 47 MG/DL
HGB BLD-MCNC: 11.1 G/DL (ref 11.1–15.9)
IMM GRANULOCYTES # BLD AUTO: 0 X10E3/UL (ref 0–0.1)
IMM GRANULOCYTES NFR BLD AUTO: 0 %
LDLC SERPL CALC-MCNC: 132 MG/DL (ref 0–99)
LYMPHOCYTES # BLD AUTO: 2.2 X10E3/UL (ref 0.7–3.1)
LYMPHOCYTES NFR BLD AUTO: 31 %
MCH RBC QN AUTO: 26.5 PG (ref 26.6–33)
MCHC RBC AUTO-ENTMCNC: 30.4 G/DL (ref 31.5–35.7)
MCV RBC AUTO: 87 FL (ref 79–97)
MONOCYTES # BLD AUTO: 0.4 X10E3/UL (ref 0.1–0.9)
MONOCYTES NFR BLD AUTO: 6 %
NEUTROPHILS # BLD AUTO: 4.5 X10E3/UL (ref 1.4–7)
NEUTROPHILS NFR BLD AUTO: 62 %
PLATELET # BLD AUTO: 326 X10E3/UL (ref 150–450)
POTASSIUM SERPL-SCNC: 4.4 MMOL/L (ref 3.5–5.2)
PROT SERPL-MCNC: 7 G/DL (ref 6–8.5)
RBC # BLD AUTO: 4.19 X10E6/UL (ref 3.77–5.28)
SODIUM SERPL-SCNC: 140 MMOL/L (ref 134–144)
TRIGL SERPL-MCNC: 112 MG/DL (ref 0–149)
VLDLC SERPL CALC-MCNC: 20 MG/DL (ref 5–40)
WBC # BLD AUTO: 7.2 X10E3/UL (ref 3.4–10.8)